# Patient Record
Sex: FEMALE | Race: OTHER
[De-identification: names, ages, dates, MRNs, and addresses within clinical notes are randomized per-mention and may not be internally consistent; named-entity substitution may affect disease eponyms.]

---

## 2021-06-17 ENCOUNTER — HOSPITAL ENCOUNTER (OUTPATIENT)
Dept: HOSPITAL 61 - RAD | Age: 41
End: 2021-06-17
Attending: FAMILY MEDICINE
Payer: COMMERCIAL

## 2021-06-17 DIAGNOSIS — R07.9: Primary | ICD-10-CM

## 2021-06-17 PROCEDURE — 71046 X-RAY EXAM CHEST 2 VIEWS: CPT

## 2021-06-17 NOTE — RAD
EXAMINATION: XR CHEST 2V



CLINICAL HISTORY: Chest pain



EXAM DATE/TIME: 6/17/2021 2:12 PM



COMPARISON: None





FINDINGS: 



Lines, Tubes, and Devices: None.



Cardiomediastinal Silhouette: Within normal limits.



Lungs and Pleura: No evidence of focal airspace consolidation or pleural effusion. Pulmonary vasculat
ure unremarkable.



Bones and Soft Tissues: No acute osseous abnormality.

 



IMPRESSION:



No evidence of acute cardiopulmonary abnormality.



Electronically signed by: Lazaro Parrish DO (6/17/2021 3:29 PM) [id number]